# Patient Record
(demographics unavailable — no encounter records)

---

## 2017-09-27 NOTE — ER DOCUMENT REPORT
ED GI/





- General


Chief Complaint: Abdominal Pain


Stated Complaint: ABDOMINAL PAIN


Time Seen by Provider: 09/27/17 18:14


Notes: 


The patient is a 50-year-old female, past medical history menorrhagia status 

post ablation in January, appendectomy, cholecystectomy, presents with 2 days 

of suprapubic, right upper quadrant and right lower quadrant abdominal pain is 

worse when she moves.  She also has a fever and has heavy vaginal bleeding 

started yesterday, now soaking through one pad every hour. In addition, she is 

having increased urination. Denies dysuria, flank pain, chest pain, shortness 

of breath, nausea, vomiting, diarrhea, constipation or rash.


TRAVEL OUTSIDE OF THE [place] IN LAST 30 DAYS: No





- Related Data


Allergies/Adverse Reactions: 


 





No Known Allergies Allergy (Unverified 09/27/17 17:55)


 











Past Medical History





- General


Information source: Patient





- Social History


Smoking Status: Never Smoker


Family History: Reviewed & Not Pertinent


Renal/ Medical History: Denies: Hx Peritoneal Dialysis





Review of Systems





- Review of Systems


Notes: 


REVIEW OF SYSTEMS:


CONSTITUTIONAL: +fevers, -chills


EENT: -eye pain, -difficulty swallowing, -nasal congestion


CARDIOVASCULAR:-chest pain, -syncope.


RESPIRATORY: -cough, -SOB


GASTROINTESTINAL: +abdominal pain, - nausea, -vomiting, -diarrhea


GENITOURINARY: +increased urination, -dysuria, -hematuria


MUSCULOSKELETAL: -back pain, -neck pain


SKIN: -rash or skin lesions.


HEMATOLOGIC: -easy bruising or bleeding.


LYMPHATIC: -swollen, enlarged glands.


NEUROLOGICAL: -altered mental status or loss of consciousness, -headache, -

neurologic symptoms


PSYCHIATRIC: -anxiety, -depression.


ALL OTHER SYSTEMS REVIEWED AND NEGATIVE.





Physical Exam





- Vital signs


Vitals: 


 











Temp Pulse Resp BP Pulse Ox


 


 101.7 F H  132 H  20   127/79 H  97 


 


 09/27/17 17:54  09/27/17 17:54  09/27/17 17:54  09/27/17 17:54  09/27/17 17:54














- Notes


Notes: 


PHYSICAL EXAMINATION:





GENERAL: Well-appearing, well-nourished and in no acute distress.





HEAD: Atraumatic, normocephalic.





EYES: Pupils equal round and reactive to light, extraocular movements intact, 

sclera anicteric, conjunctiva are normal.





ENT: nares patent, oropharynx clear without exudates.  Moist mucous membranes.





NECK: Normal range of motion, supple without lymphadenopathy





LUNGS: Breath sounds clear to auscultation bilaterally and equal.  No wheezes 

rales or rhonchi.





HEART: Tachycardia. Regular rhythm.





ABDOMEN: Soft, moderate RUQ/RLQ/suprapubic tenderness. Normal bowel sounds.  No 

masses appreciated. Right CVA tenderness.





EXTREMITIES: Normal range of motion, no pitting or edema.  No cyanosis.





NEUROLOGICAL: Cranial nerves grossly intact.  Normal speech, normal gait.  

Normal sensory and motor exams.





PSYCH: Normal mood, normal affect.





SKIN: Warm, Dry, normal turgor, no rashes or lesions noted.





Course





- Re-evaluation


Re-evalutation: 


Patient's tachycardia and fever improved after IV fluids and Tylenol.  Her CAT 

scan does not show any acute abnormalities.  Her urinalysis shows evidence of a 

urinary tract infection.  With the right CVA tenderness and systemic symptoms, 

will treat her with Keflex for pyelonephritis.  She appears well and is safe 

for outpatient treatment at this time.  Given very strict return precautions 

and she understands.  She will also follow with her gynecologist for her 

persistent menorrhagia.  No need for blood transfusion at this time.





- Vital Signs


Vital signs: 


 











Temp Pulse Resp BP Pulse Ox


 


 99 F   132 H  21 H  120/70   99 


 


 09/27/17 21:02  09/27/17 17:54  09/27/17 22:00  09/27/17 22:00  09/27/17 22:00














- Laboratory


Result Diagrams: 


 09/27/17 19:15





 09/27/17 19:15


Laboratory results interpreted by me: 


 











  09/27/17 09/27/17 09/27/17





  19:15 19:15 19:15


 


WBC  18.9 H  


 


RBC  3.59 L  


 


Hgb  11.4 L  


 


Hct  33.0 L  


 


Band Neutrophils %  22 H  


 


Lymphocytes % (Manual)  3 L  


 


Monocytes % (Manual)  1 L  


 


Abs Neuts (Manual)  18.0 H  


 


VBG pH    7.44 H


 


Direct Bilirubin   0.5 H 


 


Urine Protein   


 


Urine Blood   


 


Ur Leukocyte Esterase   


 


Urine Ascorbic Acid   














  09/27/17





  20:34


 


WBC 


 


RBC 


 


Hgb 


 


Hct 


 


Band Neutrophils % 


 


Lymphocytes % (Manual) 


 


Monocytes % (Manual) 


 


Abs Neuts (Manual) 


 


VBG pH 


 


Direct Bilirubin 


 


Urine Protein  100 H


 


Urine Blood  LARGE H


 


Ur Leukocyte Esterase  SMALL H


 


Urine Ascorbic Acid  40 H














- Diagnostic Test


Radiology reviewed: Image reviewed, Reports reviewed


Radiology results interpreted by me: 


CT A/P: NAD, fat containing ventral hernia





Discharge





- Discharge


Clinical Impression: 


 Pyelonephritis





Menorrhagia


Qualifiers:


 Menorrahagia type: with onset of menstrual periods Qualified Code(s): N92.2 - 

Excessive menstruation at puberty





Condition: Stable


Disposition: HOME, SELF-CARE


Additional Instructions: 


PYELONEPHRITIS:





     Your evaluation shows evidence of pyelonephritis.  This is an infection in 

the kidney.  Typical symptoms are fever, pain in the flank, pain on urination, 

and frequent urination.


     Many cases of pyelonephritis can be treated at home.  Hospital care may be 

necessary for patients who are very ill, or elderly or pregnant.


     Pyelonephritis is treated with antibiotics.  Be sure to take all the 

medication as prescribed.  Drink plenty of liquids (about three quarts per day)

.  You may take acetaminophen for fever.  You should feel significantly 

improved within two days.


     You should have a recheck of your urine in about one week to insure that 

the infection is gone.


     Return for a re-examination if your symptoms worsen in any way -- such as 

high fever, shaking chills, severe weakness or dizziness, severe pain, or 

inability to pass your urine.








ANTIBIOTIC THERAPY:


     You have been given an antibiotic prescription.  It's important that you 

take all the medication, unless instructed otherwise by your physician.  

Failure to complete the entire course can result in relapse of your condition.


     Common side effects of antibiotics include nausea, intestinal cramping, or 

diarrhea.  Women may develop vaginal yeast infections, and babies can get yeast 

(thrush) in the mouth following the use of antibiotics.  Contact your physician 

if you develop significant side effects from this medication.


     Allergy to this antibiotic can result in hives, wheezing, faintness, or 

itching.  If symptoms of allergy occur, stop the medication and call the doctor.








ROCEPHIN:


     You have been given an injection of an antibiotic called Rocephin (

ceftriaxone).  Sometimes the injection must be combined with antibiotic pills.  

For some infections, such as an uncomplicated ear infection, Rocephin provides 

all the antibiotic that's needed.


     The antibiotic will be in your body for about two days.  For serious 

infections, we usually repeat doses of Rocephin daily.


     Side effects are very unusual following a shot.  Women may develop vaginal 

yeast infections, and babies can get yeast (thrush) in the mouth following the 

use of antibiotics.  Contact your physician if you have symptoms with this 

medication.


     Allergy to this antibiotic can result in hives, wheezing, faintness, or 

itching.  If symptoms of allergy occur, call the doctor at once.





CEPHALEXIN:


     The antibiotic you've been prescribed is a member of the cephalosporin 

class.  This type of antibiotic covers a wide variety of infections, including 

those of the skin, lungs, and urinary tract. It's useful for staph infections.


     This antibiotic is slightly similar to the penicillin family. In rare cases

, a person who is allergic to penicillin will also be allergic to this 

medication.  If you have had a severe allergic reaction to penicillin, and have 

not taken this antibiotic since that time, notify your doctor.


     Antibiotics which cover many germs ("broad spectrum" antibiotics) are more 

likely to cause diarrhea or "yeast" infections.  Women prone to vaginal yeast 

problems may suffer an attack after taking this antibiotic.  In infants, oral 

thrush (white spots "stuck" on the cheek) or yeast diaper rash may result.  See 

your doctor if these problems occur.  Call at once if you develop itching, hives

, shortness of breath, or lightheadedness.





USE OF ACETAMINOPHEN (Tylenol):


     Acetaminophen may be taken for pain relief or fever control. It's much 

safer than aspirin, offering a wider range of "safe" dosages.  It is safe 

during pregnancy.  Some brand names are Tylenol, Panadol, Datril, Anacin 3, 

Tempra, and Liquiprin. Acetaminophen can be repeated every four hours.  The 

following are maximum recommended dosages:


>89 pounds or adults          650 mg to 900 mg


Acetaminophen can be repeated every four hours.  Maximum dose not to exceed 

4000 mg a day.








FOLLOW-UP CARE:


If you have been referred to a physician for follow-up care, call the physician

s office for an appointment as you were instructed or within the next two days.

  If you experience worsening or a significant change in your symptoms, notify 

the physician immediately or return to the Emergency Department at any time for 

re-evaluation.





Prescriptions: 


Cephalexin Monohydrate [Keflex 500 mg Capsule] 500 mg PO Q8H 10 Days  capsule

## 2017-09-27 NOTE — RADIOLOGY REPORT (SQ)
EXAM DESCRIPTION:  CT ABD/PELVIS WITH IV ONLY



COMPLETED DATE/TIME:  9/27/2017 9:00 pm



REASON FOR STUDY:  RLQ pain, septic



COMPARISON:  None.



TECHNIQUE:  CT scan of the abdomen and pelvis performed using helical scanning technique with dynamic
 intravenous contrast injection.  No oral contrast. Images reviewed with lung, soft tissue, and bone 
windows. Reconstructed coronal and sagittal MPR images reviewed. Delayed images for evaluation of the
 urinary system also acquired. All images stored on PACS.

All CT scanners at this facility use dose modulation, iterative reconstruction, and/or weight based d
osing when appropriate to reduce radiation dose to as low as reasonably achievable (ALARA).

[facility]: Dose Right  [facility]: CareDose    [facility]: Dose Right    [facility]: Teradose 4D    [facility]: Smart Technologies



CONTRAST TYPE AND DOSE:  contrast/concentration: Isovue 370.00 mg/ml; Total Contrast Delivered: 100.0
 ml; Total Saline Delivered: 40.0 ml



RENAL FUNCTION:  Choose 1



RADIATION DOSE:  Up-to-date CT equipment and radiation dose reduction techniques were employed. CTDIv
ol: 24.6 - 27.2 mGy. DLP: 2586 mGy-cm..



LIMITATIONS:  None.



FINDINGS:  LOWER CHEST: No significant findings. No nodules or infiltrates.

LIVER: Normal size. No masses.  No dilated ducts.

SPLEEN: Normal size. No focal lesions.

PANCREAS: No masses. No significant calcifications. No adjacent inflammation or peripancreatic fluid 
collections. Pancreatic duct not dilated.

GALLBLADDER: Surgically absent.

ADRENAL GLANDS: No significant masses or asymmetry.

RIGHT KIDNEY AND URETER: No solid masses.   No significant calcifications.   No hydronephrosis or hyd
roureter.

LEFT KIDNEY AND URETER: No solid masses.   No significant calcifications.   No hydronephrosis or hydr
oureter.

AORTA AND VESSELS: No aneurysm. No dissection. Renal arteries, SMA, celiac without stenosis.

RETROPERITONEUM: No retroperitoneal adenopathy, hemorrhage or masses.

BOWEL AND PERITONEAL CAVITY: No masses or inflammatory changes. No free fluid or peritoneal masses.

APPENDIX: Surgically absent.

PELVIS: No mass.  No free fluid. Normal bladder.

ABDOMINAL WALL: There is a ventral hernia periumbilical on the right containing fat.

BONES: No significant or acute findings.

OTHER: No other significant finding.



IMPRESSION:  Right periumbilical ventral hernia contains only fat.  No acute intra-abdominal process.




TECHNICAL DOCUMENTATION:  JOB ID:  [id number]

Quality ID # [id number]: Final reports with documentation of one or more dose reduction techniques (e.g., Au
tomated exposure control, adjustment of the mA and/or kV according to patient size, use of iterative 
reconstruction technique)

 2011 [employer]- All Rights Reserved